# Patient Record
Sex: FEMALE
[De-identification: names, ages, dates, MRNs, and addresses within clinical notes are randomized per-mention and may not be internally consistent; named-entity substitution may affect disease eponyms.]

---

## 2017-01-05 PROBLEM — Z00.00 ENCOUNTER FOR PREVENTIVE HEALTH EXAMINATION: Status: ACTIVE | Noted: 2017-01-05

## 2017-01-09 ENCOUNTER — APPOINTMENT (OUTPATIENT)
Dept: CARDIOLOGY | Facility: CLINIC | Age: 57
End: 2017-01-09

## 2017-01-23 ENCOUNTER — APPOINTMENT (OUTPATIENT)
Dept: CARDIOLOGY | Facility: CLINIC | Age: 57
End: 2017-01-23

## 2017-01-30 ENCOUNTER — APPOINTMENT (OUTPATIENT)
Dept: CARDIOLOGY | Facility: CLINIC | Age: 57
End: 2017-01-30

## 2017-02-20 ENCOUNTER — APPOINTMENT (OUTPATIENT)
Dept: CARDIOLOGY | Facility: CLINIC | Age: 57
End: 2017-02-20

## 2017-02-27 ENCOUNTER — APPOINTMENT (OUTPATIENT)
Dept: CARDIOLOGY | Facility: CLINIC | Age: 57
End: 2017-02-27

## 2017-08-28 ENCOUNTER — APPOINTMENT (OUTPATIENT)
Dept: CARDIOLOGY | Facility: CLINIC | Age: 57
End: 2017-08-28
Payer: COMMERCIAL

## 2017-08-28 ENCOUNTER — APPOINTMENT (OUTPATIENT)
Dept: CARDIOLOGY | Facility: CLINIC | Age: 57
End: 2017-08-28

## 2017-08-28 PROCEDURE — 99214 OFFICE O/P EST MOD 30 MIN: CPT

## 2017-09-04 ENCOUNTER — EMERGENCY (EMERGENCY)
Facility: HOSPITAL | Age: 57
LOS: 1 days | End: 2017-09-04
Payer: COMMERCIAL

## 2017-09-04 PROCEDURE — 99285 EMERGENCY DEPT VISIT HI MDM: CPT

## 2017-09-04 PROCEDURE — 71010: CPT | Mod: 26

## 2017-09-11 ENCOUNTER — APPOINTMENT (OUTPATIENT)
Dept: CARDIOLOGY | Facility: CLINIC | Age: 57
End: 2017-09-11

## 2017-11-30 ENCOUNTER — MEDICATION RENEWAL (OUTPATIENT)
Age: 57
End: 2017-11-30

## 2018-01-03 ENCOUNTER — MEDICATION RENEWAL (OUTPATIENT)
Age: 58
End: 2018-01-03

## 2018-02-01 ENCOUNTER — MEDICATION RENEWAL (OUTPATIENT)
Age: 58
End: 2018-02-01

## 2018-02-01 DIAGNOSIS — Z86.69 PERSONAL HISTORY OF OTHER DISEASES OF THE NERVOUS SYSTEM AND SENSE ORGANS: ICD-10-CM

## 2018-02-01 DIAGNOSIS — Z83.3 FAMILY HISTORY OF DIABETES MELLITUS: ICD-10-CM

## 2018-02-01 DIAGNOSIS — Z86.79 PERSONAL HISTORY OF OTHER DISEASES OF THE CIRCULATORY SYSTEM: ICD-10-CM

## 2018-02-01 DIAGNOSIS — Z82.5 FAMILY HISTORY OF ASTHMA AND OTHER CHRONIC LOWER RESPIRATORY DISEASES: ICD-10-CM

## 2018-02-01 DIAGNOSIS — Z82.49 FAMILY HISTORY OF ISCHEMIC HEART DISEASE AND OTHER DISEASES OF THE CIRCULATORY SYSTEM: ICD-10-CM

## 2018-02-01 DIAGNOSIS — Z78.9 OTHER SPECIFIED HEALTH STATUS: ICD-10-CM

## 2018-02-01 DIAGNOSIS — Z83.1 FAMILY HISTORY OF OTHER INFECTIOUS AND PARASITIC DISEASES: ICD-10-CM

## 2018-02-01 DIAGNOSIS — R42 DIZZINESS AND GIDDINESS: ICD-10-CM

## 2018-02-01 DIAGNOSIS — F17.200 NICOTINE DEPENDENCE, UNSPECIFIED, UNCOMPLICATED: ICD-10-CM

## 2018-02-01 DIAGNOSIS — Z80.8 FAMILY HISTORY OF MALIGNANT NEOPLASM OF OTHER ORGANS OR SYSTEMS: ICD-10-CM

## 2018-02-28 ENCOUNTER — RECORD ABSTRACTING (OUTPATIENT)
Age: 58
End: 2018-02-28

## 2018-03-05 ENCOUNTER — NON-APPOINTMENT (OUTPATIENT)
Age: 58
End: 2018-03-05

## 2018-03-05 ENCOUNTER — APPOINTMENT (OUTPATIENT)
Dept: CARDIOLOGY | Facility: CLINIC | Age: 58
End: 2018-03-05
Payer: COMMERCIAL

## 2018-03-05 VITALS
DIASTOLIC BLOOD PRESSURE: 86 MMHG | WEIGHT: 130 LBS | HEART RATE: 90 BPM | SYSTOLIC BLOOD PRESSURE: 130 MMHG | HEIGHT: 62 IN | OXYGEN SATURATION: 100 % | BODY MASS INDEX: 23.92 KG/M2

## 2018-03-05 PROCEDURE — 99214 OFFICE O/P EST MOD 30 MIN: CPT

## 2018-03-05 PROCEDURE — 93000 ELECTROCARDIOGRAM COMPLETE: CPT

## 2018-04-02 ENCOUNTER — APPOINTMENT (OUTPATIENT)
Dept: CARDIOLOGY | Facility: CLINIC | Age: 58
End: 2018-04-02

## 2018-05-15 ENCOUNTER — APPOINTMENT (OUTPATIENT)
Dept: CARDIOLOGY | Facility: CLINIC | Age: 58
End: 2018-05-15
Payer: COMMERCIAL

## 2018-05-15 PROCEDURE — 93306 TTE W/DOPPLER COMPLETE: CPT

## 2018-08-13 ENCOUNTER — MEDICATION RENEWAL (OUTPATIENT)
Age: 58
End: 2018-08-13

## 2018-10-15 ENCOUNTER — APPOINTMENT (OUTPATIENT)
Dept: CARDIOLOGY | Facility: CLINIC | Age: 58
End: 2018-10-15
Payer: COMMERCIAL

## 2018-10-15 VITALS
HEART RATE: 92 BPM | DIASTOLIC BLOOD PRESSURE: 88 MMHG | WEIGHT: 128 LBS | OXYGEN SATURATION: 100 % | HEIGHT: 62 IN | BODY MASS INDEX: 23.55 KG/M2 | SYSTOLIC BLOOD PRESSURE: 158 MMHG

## 2018-10-15 DIAGNOSIS — Z86.79 PERSONAL HISTORY OF OTHER DISEASES OF THE CIRCULATORY SYSTEM: ICD-10-CM

## 2018-10-15 PROCEDURE — 99214 OFFICE O/P EST MOD 30 MIN: CPT

## 2018-10-18 ENCOUNTER — MEDICATION RENEWAL (OUTPATIENT)
Age: 58
End: 2018-10-18

## 2019-01-10 RX ORDER — IRBESARTAN 75 MG/1
75 TABLET ORAL DAILY
Qty: 90 | Refills: 1 | Status: DISCONTINUED | COMMUNITY
Start: 2017-11-30 | End: 2019-01-10

## 2019-01-16 ENCOUNTER — MEDICATION RENEWAL (OUTPATIENT)
Age: 59
End: 2019-01-16

## 2019-01-16 ENCOUNTER — RX RENEWAL (OUTPATIENT)
Age: 59
End: 2019-01-16

## 2019-04-16 ENCOUNTER — RX RENEWAL (OUTPATIENT)
Age: 59
End: 2019-04-16

## 2019-04-29 ENCOUNTER — APPOINTMENT (OUTPATIENT)
Dept: CARDIOLOGY | Facility: CLINIC | Age: 59
End: 2019-04-29
Payer: COMMERCIAL

## 2019-04-29 ENCOUNTER — NON-APPOINTMENT (OUTPATIENT)
Age: 59
End: 2019-04-29

## 2019-04-29 VITALS
HEART RATE: 93 BPM | OXYGEN SATURATION: 99 % | WEIGHT: 131 LBS | BODY MASS INDEX: 24.11 KG/M2 | SYSTOLIC BLOOD PRESSURE: 170 MMHG | DIASTOLIC BLOOD PRESSURE: 86 MMHG | HEIGHT: 62 IN

## 2019-04-29 PROCEDURE — 93000 ELECTROCARDIOGRAM COMPLETE: CPT

## 2019-04-29 PROCEDURE — 99214 OFFICE O/P EST MOD 30 MIN: CPT

## 2019-04-29 NOTE — DISCUSSION/SUMMARY
[FreeTextEntry1] : Cass is a 58-year-old female with medical history detailed above and active medical issues including:\par \par -Labile hypertension.  Office blood pressure is currently above goal <130/80 on irbesartan 75 mg per day  and amlodipine 2.5mg daily. Purchase a new home BP cuff check blood pressure daily over one week to confirm average resting blood pressure is at goal. \par \par -Dyspnea on exertion, abnormal EKG, CAD risk factors, normal Stress echo, normal LVEF Feb 2017. Patient will have noninvasive testing with exercise stress echo to assess for obstructive CAD, HR and BP response, exercise-induced arrhythmia, 2DEcho for LVEF, structural heart disease, carotid and abdominal ultrasound to assess for obstructive PAD.\par \par -Tobacco addiction. Smoking cessation has been discussed at length, patient will make an effort to reduce smoking and quit.\par \par - Hyperkalemia, hyponatremia and diarrhea improved after discontinuation of juicing. \par \par -Family history of premature CAD\par \par Advised patient to follow active lifestyle with regular cardiovascular exercise. Patient educated on lifestyle and diet modification with low sodium low fat diet and avoidance of excessive alcohol. Patient is aware to call with any symptoms or concerns. \par \par Patient will be seen in cardiology follow-up after noninvasive testing. \par \par Cass will follow up with Dr. Alvin Gaytan for primary care\par

## 2019-04-29 NOTE — PHYSICAL EXAM
[General Appearance - Well Developed] : well developed [Normal Appearance] : normal appearance [Well Groomed] : well groomed [General Appearance - Well Nourished] : well nourished [No Deformities] : no deformities [General Appearance - In No Acute Distress] : no acute distress [Normal Conjunctiva] : the conjunctiva exhibited no abnormalities [Eyelids - No Xanthelasma] : the eyelids demonstrated no xanthelasmas [Normal Oral Mucosa] : normal oral mucosa [No Oral Pallor] : no oral pallor [No Oral Cyanosis] : no oral cyanosis [Normal Jugular Venous A Waves Present] : normal jugular venous A waves present [Normal Jugular Venous V Waves Present] : normal jugular venous V waves present [No Jugular Venous Zapata A Waves] : no jugular venous zapata A waves [Respiration, Rhythm And Depth] : normal respiratory rhythm and effort [Exaggerated Use Of Accessory Muscles For Inspiration] : no accessory muscle use [Auscultation Breath Sounds / Voice Sounds] : lungs were clear to auscultation bilaterally [Heart Rate And Rhythm] : heart rate and rhythm were normal [Heart Sounds] : normal S1 and S2 [Murmurs] : no murmurs present [Abdomen Soft] : soft [Abdomen Tenderness] : non-tender [Abdomen Mass (___ Cm)] : no abdominal mass palpated [Nail Clubbing] : no clubbing of the fingernails [Cyanosis, Localized] : no localized cyanosis [Petechial Hemorrhages (___cm)] : no petechial hemorrhages [Skin Color & Pigmentation] : normal skin color and pigmentation [] : no rash [No Venous Stasis] : no venous stasis [Skin Lesions] : no skin lesions [No Skin Ulcers] : no skin ulcer [No Xanthoma] : no  xanthoma was observed [Oriented To Time, Place, And Person] : oriented to person, place, and time [Affect] : the affect was normal [Mood] : the mood was normal [No Anxiety] : not feeling anxious [FreeTextEntry1] : patient has odor of heavy tobacco

## 2019-04-29 NOTE — REASON FOR VISIT
[Follow-Up - Clinic] : a clinic follow-up of [Abnormal ECG] : an abnormal ECG [FreeTextEntry2] : MENDOZA, HTN, abnormal, tobacco addiction [FreeTextEntry1] : Cass is a 58-year-old female with recent diagnosis labile hypertension on Ibsartan, amlodipine, tobacco addiction 1PPD, family history of premature CAD, murmur.\par \par Patient has dyspnea moderate exertion unchanged,  Cardiovascular review of symptoms is negative for exertional chest pain, dyspnea, palpitations, dizziness or syncope. No PND or orthopnea leg edema. No bleeding or black stool.\par \par Patient is not exercising routine basis. She is walking 10 minutes with moderate dyspnea. She continues to smoke one pack per day. Smoking cessation has been discussed at length, patient will make an effort to reduce smoking and quit\par \par Office blood pressure is currently above goal <130/80 on irbesartan 75 mg per day  and amlodipine 2.5mg daily. Purchase a new home BP cuff check blood pressure daily over one week to confirm average resting blood pressure is at goal. \par \par Patient had been drinking large amounts of "juicing" drinks with vegetables. Patient had diarrhea and has improved after discontinuation of juicing. Hyperkalemia and hyponatremia has resolved after stopping juicing.\par \par Cass states that Jan 2017 she had upper respiratory infection, severe left thumb pain possibly related to carpal tunnel. Evaluation in [facility] found to have blood pressure of 201/98 started on Norvasc and Ibsartan. Patient had near syncope evaluated emergency room [facility]. She had follow up with PMD blood pressure elevation 180/90 Ibsartan increased to 150 mg in the morning with amlodipine 2.5 mg p.m. dose.\par \par Stress echo February February 2017, LVEF 60% normal exercise wall motion no ischemia, nonischemic stress EKG Baseline NSR, old septal MI, no anginal symptoms, 80% MPHR, 7 minute 45 seconds Jose protocol.\par \par 2-D echo January 2017, LVF 60%, mild diastolic dysfunction, mild MR and TR normal PASP\par \par Carotid and abdominal ultrasound January 2017 nonobstructive normal aortic size.

## 2019-05-20 ENCOUNTER — APPOINTMENT (OUTPATIENT)
Dept: CARDIOLOGY | Facility: CLINIC | Age: 59
End: 2019-05-20
Payer: COMMERCIAL

## 2019-05-20 PROCEDURE — 93880 EXTRACRANIAL BILAT STUDY: CPT

## 2019-06-24 ENCOUNTER — APPOINTMENT (OUTPATIENT)
Dept: CARDIOLOGY | Facility: CLINIC | Age: 59
End: 2019-06-24
Payer: COMMERCIAL

## 2019-06-24 PROCEDURE — 93351 STRESS TTE COMPLETE: CPT

## 2019-07-16 ENCOUNTER — RX RENEWAL (OUTPATIENT)
Age: 59
End: 2019-07-16

## 2019-07-16 ENCOUNTER — RX CHANGE (OUTPATIENT)
Age: 59
End: 2019-07-16

## 2019-07-16 RX ORDER — IRBESARTAN 75 MG/1
75 TABLET ORAL DAILY
Qty: 90 | Refills: 0 | Status: DISCONTINUED | COMMUNITY
End: 2019-07-16

## 2019-07-31 ENCOUNTER — MEDICATION RENEWAL (OUTPATIENT)
Age: 59
End: 2019-07-31

## 2019-08-26 ENCOUNTER — APPOINTMENT (OUTPATIENT)
Dept: CARDIOLOGY | Facility: CLINIC | Age: 59
End: 2019-08-26
Payer: COMMERCIAL

## 2019-08-26 VITALS
BODY MASS INDEX: 24.66 KG/M2 | DIASTOLIC BLOOD PRESSURE: 82 MMHG | WEIGHT: 134 LBS | HEIGHT: 62 IN | HEART RATE: 90 BPM | OXYGEN SATURATION: 99 % | SYSTOLIC BLOOD PRESSURE: 162 MMHG

## 2019-08-26 PROCEDURE — 99214 OFFICE O/P EST MOD 30 MIN: CPT

## 2019-08-26 NOTE — REVIEW OF SYSTEMS
[Shortness Of Breath] : shortness of breath [Dyspnea on exertion] : dyspnea during exertion [Negative] : Heme/Lymph [Chest  Pressure] : no chest pressure [Lower Ext Edema] : no extremity edema [Chest Pain] : no chest pain [Leg Claudication] : no intermittent leg claudication [Palpitations] : no palpitations

## 2019-08-26 NOTE — REASON FOR VISIT
[Follow-Up - Clinic] : a clinic follow-up of [Abnormal ECG] : an abnormal ECG [FreeTextEntry1] : Cass is a 58-year-old female with recent diagnosis labile hypertension on Ibsartan, amlodipine, tobacco addiction 1PPD, family history of premature CAD, murmur.\par \par Patient has dyspnea moderate exertion unchanged,  Cardiovascular review of symptoms is negative for exertional chest pain, dyspnea, palpitations, dizziness or syncope. No PND or orthopnea leg edema. No bleeding or black stool.\par \par Patient is not exercising routine basis. She is walking 10 minutes with moderate dyspnea. She continues to smoke one pack per day. Smoking cessation has been discussed at length, patient will make an effort to reduce smoking and quit\par \par Office blood pressure is currently above goal (<130/80) on Losartan 25 mg per day  and amlodipine 2.5mg daily. Not checking BP at home. \par \par Patient had been drinking large amounts of "juicing" drinks with vegetables. Patient had diarrhea and has improved after discontinuation of juicing. Hyperkalemia and hyponatremia has resolved after stopping juicing.\par \par Cass states that Jan 2017 she had upper respiratory infection, severe left thumb pain possibly related to carpal tunnel. Evaluation in [facility] found to have blood pressure of 201/98 started on Norvasc and Ibsartan. Patient had near syncope evaluated emergency room [facility]. She had follow up with PMD blood pressure elevation 180/90 Ibsartan increased to 150 mg in the morning with amlodipine 2.5 mg p.m. dose.\par \par Stress echo  6/24/2019, LVEF 60% normal exercise wall motion no ischemia, nonischemic stress EKG Baseline NSR, old septal MI, no anginal symptoms, 80% MPHR, 6 minute 01 seconds Jose protocol.\par \par 2-D echo January 2017, LVF 60%, mild diastolic dysfunction, mild MR and TR normal PASP\par \par Carotid and abdominal ultrasound 5/20/2019 nonobstructive normal aortic size. Thyroid nodule 1 x 1cm [FreeTextEntry2] : MENDOZA, HTN, abnormal, tobacco addiction

## 2019-08-26 NOTE — DISCUSSION/SUMMARY
[FreeTextEntry1] : Cass is a 58-year-old female with medical history detailed above and active medical issues including:\par \par Labile HTN: Office blood pressure is currently above goal (<130/80) on Losartan 25 mg per day  and amlodipine 2.5mg daily. Not checking BP at home.  Hypertensive response to exercise on stress echo. Recommend increasing amlodipine to 5mg dailiy. \par \par \par -Dyspnea on exertion, abnormal EKG, CAD risk factors, normal Stress echo, normal LVEF 6/24/2019. \par \par -Tobacco addiction. Smoking cessation has been discussed at length, patient will make an effort to reduce smoking and quit.\par \par - Hyperkalemia, hyponatremia and diarrhea improved after discontinuation of juicing. \par \par -Family history of premature CAD\par \par Advised patient to follow active lifestyle with regular cardiovascular exercise. Patient educated on lifestyle and diet modification with low sodium low fat diet and avoidance of excessive alcohol. Patient is aware to call with any symptoms or concerns. \par \par Patient will be seen in cardiology follow-up 4 weeks for BP check.\par \par \par

## 2019-08-26 NOTE — PHYSICAL EXAM
[General Appearance - Well Developed] : well developed [Normal Appearance] : normal appearance [Well Groomed] : well groomed [General Appearance - Well Nourished] : well nourished [No Deformities] : no deformities [General Appearance - In No Acute Distress] : no acute distress [Normal Conjunctiva] : the conjunctiva exhibited no abnormalities [Eyelids - No Xanthelasma] : the eyelids demonstrated no xanthelasmas [Normal Oral Mucosa] : normal oral mucosa [No Oral Cyanosis] : no oral cyanosis [No Oral Pallor] : no oral pallor [Normal Jugular Venous A Waves Present] : normal jugular venous A waves present [Normal Jugular Venous V Waves Present] : normal jugular venous V waves present [No Jugular Venous Zapata A Waves] : no jugular venous zapata A waves [Respiration, Rhythm And Depth] : normal respiratory rhythm and effort [Exaggerated Use Of Accessory Muscles For Inspiration] : no accessory muscle use [Auscultation Breath Sounds / Voice Sounds] : lungs were clear to auscultation bilaterally [Heart Sounds] : normal S1 and S2 [Heart Rate And Rhythm] : heart rate and rhythm were normal [Murmurs] : no murmurs present [Abnormal Walk] : normal gait [Gait - Sufficient For Exercise Testing] : the gait was sufficient for exercise testing [Cyanosis, Localized] : no localized cyanosis [Nail Clubbing] : no clubbing of the fingernails [Petechial Hemorrhages (___cm)] : no petechial hemorrhages [Skin Color & Pigmentation] : normal skin color and pigmentation [] : no rash [No Venous Stasis] : no venous stasis [Skin Lesions] : no skin lesions [No Skin Ulcers] : no skin ulcer [No Xanthoma] : no  xanthoma was observed [Oriented To Time, Place, And Person] : oriented to person, place, and time [Mood] : the mood was normal [Affect] : the affect was normal [No Anxiety] : not feeling anxious [FreeTextEntry1] : patient has odor of heavy tobacco

## 2019-09-30 ENCOUNTER — APPOINTMENT (OUTPATIENT)
Dept: CARDIOLOGY | Facility: CLINIC | Age: 59
End: 2019-09-30
Payer: COMMERCIAL

## 2019-09-30 VITALS
HEART RATE: 86 BPM | SYSTOLIC BLOOD PRESSURE: 140 MMHG | BODY MASS INDEX: 24.29 KG/M2 | HEIGHT: 62 IN | DIASTOLIC BLOOD PRESSURE: 82 MMHG | WEIGHT: 132 LBS | OXYGEN SATURATION: 96 %

## 2019-09-30 PROCEDURE — 99214 OFFICE O/P EST MOD 30 MIN: CPT

## 2019-09-30 NOTE — REVIEW OF SYSTEMS
[Dyspnea on exertion] : dyspnea during exertion [Shortness Of Breath] : shortness of breath [Negative] : Heme/Lymph [Chest  Pressure] : no chest pressure [Chest Pain] : no chest pain [Lower Ext Edema] : no extremity edema [Leg Claudication] : no intermittent leg claudication [Palpitations] : no palpitations

## 2019-09-30 NOTE — REASON FOR VISIT
[Follow-Up - Clinic] : a clinic follow-up of [FreeTextEntry1] : Cass is a 58-year-old female with recent diagnosis labile hypertension on Ibsartan, amlodipine, tobacco addiction 1PPD, family history of premature CAD, murmur.\par \par Patient has dyspnea moderate exertion unchanged,  Cardiovascular review of symptoms is negative for exertional chest pain, dyspnea, palpitations, dizziness or syncope. No PND or orthopnea leg edema. No bleeding or black stool.\par \par Patient is not exercising routine basis. She is walking 10 minutes with moderate dyspnea. She continues to smoke one pack per day. Smoking cessation has been discussed at length, patient will make an effort to reduce smoking and quit\par \par Office blood pressure is currently above goal <130/80 on irbesartan 75 mg per day  and amlodipine 2.5mg daily. Purchase a new home BP cuff check blood pressure daily over one week to confirm average resting blood pressure is at goal. \par \par Patient had been drinking large amounts of "juicing" drinks with vegetables. Patient had diarrhea and has improved after discontinuation of juicing. Hyperkalemia and hyponatremia has resolved after stopping juicing.\par \par Cass states that Jan 2017 she had upper respiratory infection, severe left thumb pain possibly related to carpal tunnel. Evaluation in [facility] found to have blood pressure of 201/98 started on Norvasc and Ibsartan. Patient had near syncope evaluated emergency room [facility]. She had follow up with PMD blood pressure elevation 180/90 Ibsartan increased to 150 mg in the morning with amlodipine 2.5 mg p.m. dose.\par \par Exercise stress echo June 2019, LVEF 60% normal exercise wall motion, no ischemic EKG response, no chest pain, 91% MPHR, 6 minutes Jose protocol.  Baseline sinus rhythm \par \par Stress echo February February 2017, LVEF 60% normal exercise wall motion no ischemia, nonischemic stress EKG Baseline NSR, old septal MI, no anginal symptoms, 80% MPHR, 7 minute 45 seconds Jose protocol.\par \par 2-D echo January 2017, LVF 60%, mild diastolic dysfunction, mild MR and TR normal PASP\par \par Carotid and abdominal ultrasound January 2017 nonobstructive normal aortic size. [FreeTextEntry2] : MENDOZA, HTN, abnormal EKG, tobacco addiction

## 2019-09-30 NOTE — DISCUSSION/SUMMARY
[FreeTextEntry1] : Cass is a 58-year-old female with medical history detailed above and active medical issues including:\par \par -Labile hypertension.  Office blood pressure is currently above goal <130/80 on irbesartan 75 mg per day  and amlodipine 2.5mg daily. Purchase a new home BP cuff check blood pressure daily over one week to confirm average resting blood pressure is at goal. \par \par -Dyspnea on exertion, abnormal EKG, CAD risk factors, normal Stress echo, normal LVEF June 2019. \par \par -Tobacco addiction. Smoking cessation has been discussed at length, patient will make an effort to reduce smoking and quit.\par \par - Hyperkalemia, hyponatremia and diarrhea improved after discontinuation of juicing. \par \par -Family history of premature CAD\par \par Advised patient to follow active lifestyle with regular cardiovascular exercise. Patient educated on lifestyle and diet modification with low sodium low fat diet and avoidance of excessive alcohol. Patient is aware to call with any symptoms or concerns. \par \par Patient will be seen in cardiology follow-up after noninvasive testing. Patient will have 2-D echocardiogram to assess for  LV systolic function, wall motion and  structural heart disease. Abdominal ultrasound to assess for obstructive PAD. \par \par Cass will follow up with Dr. Alvin Gaytan for primary care\par \par

## 2019-09-30 NOTE — PHYSICAL EXAM
[General Appearance - Well Developed] : well developed [Normal Appearance] : normal appearance [Well Groomed] : well groomed [General Appearance - Well Nourished] : well nourished [General Appearance - In No Acute Distress] : no acute distress [No Deformities] : no deformities [Normal Conjunctiva] : the conjunctiva exhibited no abnormalities [Normal Oral Mucosa] : normal oral mucosa [Eyelids - No Xanthelasma] : the eyelids demonstrated no xanthelasmas [No Oral Pallor] : no oral pallor [No Oral Cyanosis] : no oral cyanosis [Normal Jugular Venous A Waves Present] : normal jugular venous A waves present [No Jugular Venous Zapata A Waves] : no jugular venous zapata A waves [Normal Jugular Venous V Waves Present] : normal jugular venous V waves present [Respiration, Rhythm And Depth] : normal respiratory rhythm and effort [Auscultation Breath Sounds / Voice Sounds] : lungs were clear to auscultation bilaterally [Exaggerated Use Of Accessory Muscles For Inspiration] : no accessory muscle use [Heart Sounds] : normal S1 and S2 [Heart Rate And Rhythm] : heart rate and rhythm were normal [Murmurs] : no murmurs present [Abdomen Tenderness] : non-tender [Abdomen Soft] : soft [Abdomen Mass (___ Cm)] : no abdominal mass palpated [Nail Clubbing] : no clubbing of the fingernails [Cyanosis, Localized] : no localized cyanosis [Petechial Hemorrhages (___cm)] : no petechial hemorrhages [Skin Color & Pigmentation] : normal skin color and pigmentation [No Venous Stasis] : no venous stasis [] : no rash [Skin Lesions] : no skin lesions [No Skin Ulcers] : no skin ulcer [No Xanthoma] : no  xanthoma was observed [Oriented To Time, Place, And Person] : oriented to person, place, and time [Affect] : the affect was normal [Mood] : the mood was normal [No Anxiety] : not feeling anxious [FreeTextEntry1] : patient has odor of heavy tobacco

## 2020-06-23 ENCOUNTER — APPOINTMENT (OUTPATIENT)
Dept: CARDIOLOGY | Facility: CLINIC | Age: 60
End: 2020-06-23
Payer: COMMERCIAL

## 2020-06-23 PROCEDURE — 93979 VASCULAR STUDY: CPT

## 2020-06-23 PROCEDURE — 93306 TTE W/DOPPLER COMPLETE: CPT

## 2020-07-21 ENCOUNTER — APPOINTMENT (OUTPATIENT)
Dept: CARDIOLOGY | Facility: CLINIC | Age: 60
End: 2020-07-21

## 2021-01-11 ENCOUNTER — APPOINTMENT (OUTPATIENT)
Dept: CARDIOLOGY | Facility: CLINIC | Age: 61
End: 2021-01-11
Payer: COMMERCIAL

## 2021-01-11 ENCOUNTER — NON-APPOINTMENT (OUTPATIENT)
Age: 61
End: 2021-01-11

## 2021-01-11 VITALS
TEMPERATURE: 98.2 F | OXYGEN SATURATION: 99 % | WEIGHT: 135 LBS | HEART RATE: 99 BPM | HEIGHT: 62 IN | DIASTOLIC BLOOD PRESSURE: 90 MMHG | BODY MASS INDEX: 24.84 KG/M2 | SYSTOLIC BLOOD PRESSURE: 142 MMHG

## 2021-01-11 PROCEDURE — 99072 ADDL SUPL MATRL&STAF TM PHE: CPT

## 2021-01-11 PROCEDURE — 99214 OFFICE O/P EST MOD 30 MIN: CPT

## 2021-01-11 PROCEDURE — 93000 ELECTROCARDIOGRAM COMPLETE: CPT

## 2021-01-11 NOTE — REASON FOR VISIT
[Follow-Up - Clinic] : a clinic follow-up of [FreeTextEntry2] : MENDOZA, HTN, abnormal EKG, tobacco addiction [FreeTextEntry1] : Cass is a 60-year-old female with labile hypertension on losartan, amlodipine, tobacco addiction 1PPD, family history of premature CAD, murmur.\par \par Patient has dyspnea moderate exertion unchanged,  Cardiovascular review of symptoms is negative for exertional chest pain, dyspnea, palpitations, dizziness or syncope. No PND or orthopnea leg edema. No bleeding or black stool.\par \par Patient is not exercising routine basis. She is walking 10 minutes with moderate dyspnea. She continues to smoke one pack per day. Smoking cessation has been discussed at length, patient will make an effort to reduce smoking and quit\par \par Average home blood pressures at guideline goal on amlodipine, losartan\par \par Patient had been drinking large amounts of "juicing" drinks with vegetables. Patient had diarrhea and has improved after discontinuation of juicing. Hyperkalemia and hyponatremia has resolved after stopping juicing.\par \par Cass states that Jan 2017 she had upper respiratory infection, severe left thumb pain possibly related to carpal tunnel. Evaluation in [facility] found to have blood pressure of 201/98 started on Norvasc and Ibsartan. Patient had near syncope evaluated emergency room [facility]. She had follow up with PMD blood pressure elevation 180/90 Ibsartan increased to 150 mg in the morning with amlodipine 2.5 mg p.m. dose.\par \par Exercise stress echo June 2019, LVEF 60% normal exercise wall motion, no ischemic EKG response, no chest pain, 91% MPHR, 6 minutes Jose protocol.  Baseline sinus rhythm \par \par Stress echo February February 2017, LVEF 60% normal exercise wall motion no ischemia, nonischemic stress EKG Baseline NSR, old septal MI, no anginal symptoms, 80% MPHR, 7 minute 45 seconds Jose protocol.\par \par 2-D echo January 2017, LVF 60%, mild diastolic dysfunction, mild MR and TR normal PASP\par \par Carotid and abdominal ultrasound January 2017 nonobstructive normal aortic size.

## 2021-01-11 NOTE — DISCUSSION/SUMMARY
[FreeTextEntry1] : Cass is a 58-year-old female with medical history detailed above and active medical issues including:\par \par -Labile hypertension.  Average home blood pressures at guideline goal on amlodipine, losartan\par \par -Dyspnea on exertion, abnormal EKG, CAD risk factors, normal Stress echo, normal LVEF June 2019. \par \par -Tobacco addiction. Smoking cessation has been discussed at length, patient will make an effort to reduce smoking and quit.\par \par - Hyperkalemia, hyponatremia and diarrhea improved after discontinuation of juicing. \par \par -Family history of premature CAD\par \par Advised patient to follow active lifestyle with regular cardiovascular exercise. Patient educated on lifestyle and diet modification with low sodium low fat diet and avoidance of excessive alcohol. Patient is aware to call with any symptoms or concerns. \par \par Patient will be seen in cardiology follow-up 6 months.  Patient will have 2-D echocardiogram to assess LV systolic function, structural heart disease.  Carotid and abdominal ultrasound to assess for obstructive PAD.\par \par Cass will follow up with Dr. Alvin Gaytan for primary care\par \par

## 2021-01-11 NOTE — PHYSICAL EXAM
[General Appearance - Well Developed] : well developed [Normal Appearance] : normal appearance [Well Groomed] : well groomed [No Deformities] : no deformities [General Appearance - Well Nourished] : well nourished [General Appearance - In No Acute Distress] : no acute distress [Normal Conjunctiva] : the conjunctiva exhibited no abnormalities [Eyelids - No Xanthelasma] : the eyelids demonstrated no xanthelasmas [Normal Oral Mucosa] : normal oral mucosa [No Oral Pallor] : no oral pallor [No Oral Cyanosis] : no oral cyanosis [Normal Jugular Venous A Waves Present] : normal jugular venous A waves present [Normal Jugular Venous V Waves Present] : normal jugular venous V waves present [No Jugular Venous Zapata A Waves] : no jugular venous zapata A waves [Respiration, Rhythm And Depth] : normal respiratory rhythm and effort [Auscultation Breath Sounds / Voice Sounds] : lungs were clear to auscultation bilaterally [Exaggerated Use Of Accessory Muscles For Inspiration] : no accessory muscle use [Heart Rate And Rhythm] : heart rate and rhythm were normal [Heart Sounds] : normal S1 and S2 [Murmurs] : no murmurs present [Abdomen Soft] : soft [Abdomen Tenderness] : non-tender [Abdomen Mass (___ Cm)] : no abdominal mass palpated [Nail Clubbing] : no clubbing of the fingernails [Cyanosis, Localized] : no localized cyanosis [Petechial Hemorrhages (___cm)] : no petechial hemorrhages [Skin Color & Pigmentation] : normal skin color and pigmentation [] : no rash [No Venous Stasis] : no venous stasis [Skin Lesions] : no skin lesions [No Skin Ulcers] : no skin ulcer [No Xanthoma] : no  xanthoma was observed [Affect] : the affect was normal [Oriented To Time, Place, And Person] : oriented to person, place, and time [Mood] : the mood was normal [No Anxiety] : not feeling anxious [FreeTextEntry1] : patient has odor of heavy tobacco

## 2021-07-06 ENCOUNTER — APPOINTMENT (OUTPATIENT)
Dept: CARDIOLOGY | Facility: CLINIC | Age: 61
End: 2021-07-06
Payer: COMMERCIAL

## 2021-07-06 PROCEDURE — 93306 TTE W/DOPPLER COMPLETE: CPT

## 2021-07-06 PROCEDURE — 93880 EXTRACRANIAL BILAT STUDY: CPT

## 2021-07-06 PROCEDURE — 99072 ADDL SUPL MATRL&STAF TM PHE: CPT

## 2021-07-06 PROCEDURE — 93979 VASCULAR STUDY: CPT

## 2021-07-19 ENCOUNTER — NON-APPOINTMENT (OUTPATIENT)
Age: 61
End: 2021-07-19

## 2021-07-19 ENCOUNTER — APPOINTMENT (OUTPATIENT)
Dept: CARDIOLOGY | Facility: CLINIC | Age: 61
End: 2021-07-19
Payer: COMMERCIAL

## 2021-07-19 VITALS
HEIGHT: 62.5 IN | SYSTOLIC BLOOD PRESSURE: 126 MMHG | BODY MASS INDEX: 24.04 KG/M2 | HEART RATE: 94 BPM | WEIGHT: 134 LBS | DIASTOLIC BLOOD PRESSURE: 80 MMHG | OXYGEN SATURATION: 98 % | TEMPERATURE: 98.1 F

## 2021-07-19 DIAGNOSIS — I73.9 PERIPHERAL VASCULAR DISEASE, UNSPECIFIED: ICD-10-CM

## 2021-07-19 PROCEDURE — 99214 OFFICE O/P EST MOD 30 MIN: CPT

## 2021-07-19 PROCEDURE — 99072 ADDL SUPL MATRL&STAF TM PHE: CPT

## 2021-07-19 NOTE — DISCUSSION/SUMMARY
[FreeTextEntry1] : Cass is a 60-year-old female with medical history detailed above and active medical issues including:\par \par -Labile hypertension.  Average home blood pressures at guideline goal on amlodipine, losartan\par \par -Dyspnea on exertion, abnormal EKG, CAD risk factors, normal Stress echo, normal LVEF June 2019. \par \par -Tobacco addiction. Smoking cessation has been discussed at length, patient will make an effort to reduce smoking and quit.\par \par -  PVD, left leg claudication walking less than 1 block.  MARYCHUY ordered with TEB follow-up. \par \par - Hyperkalemia, hyponatremia and diarrhea improved after discontinuation of juicing. \par \par -Family history of premature CAD\par \par Advised patient to follow active lifestyle with regular cardiovascular exercise. Patient educated on lifestyle and diet modification with low sodium low fat diet and avoidance of excessive alcohol. Patient is aware to call with any symptoms or concerns. \par \par Patient will be seen in cardiology follow-up 6 months. \par \par Cass will follow up with Dr. Alvin Gaytan for primary care\par \par

## 2021-07-19 NOTE — PHYSICAL EXAM
[General Appearance - Well Developed] : well developed [Normal Appearance] : normal appearance [Well Groomed] : well groomed [General Appearance - Well Nourished] : well nourished [No Deformities] : no deformities [General Appearance - In No Acute Distress] : no acute distress [Eyelids - No Xanthelasma] : the eyelids demonstrated no xanthelasmas [Normal Oral Mucosa] : normal oral mucosa [No Oral Pallor] : no oral pallor [No Oral Cyanosis] : no oral cyanosis [Normal Jugular Venous A Waves Present] : normal jugular venous A waves present [Normal Jugular Venous V Waves Present] : normal jugular venous V waves present [No Jugular Venous Zapata A Waves] : no jugular venous zpaata A waves [Respiration, Rhythm And Depth] : normal respiratory rhythm and effort [Exaggerated Use Of Accessory Muscles For Inspiration] : no accessory muscle use [Auscultation Breath Sounds / Voice Sounds] : lungs were clear to auscultation bilaterally [Heart Rate And Rhythm] : heart rate and rhythm were normal [Heart Sounds] : normal S1 and S2 [Murmurs] : no murmurs present [Abdomen Soft] : soft [Abdomen Tenderness] : non-tender [Abdomen Mass (___ Cm)] : no abdominal mass palpated [Nail Clubbing] : no clubbing of the fingernails [Cyanosis, Localized] : no localized cyanosis [Petechial Hemorrhages (___cm)] : no petechial hemorrhages [Skin Color & Pigmentation] : normal skin color and pigmentation [] : no rash [No Venous Stasis] : no venous stasis [Skin Lesions] : no skin lesions [No Skin Ulcers] : no skin ulcer [No Xanthoma] : no  xanthoma was observed [Oriented To Time, Place, And Person] : oriented to person, place, and time [Affect] : the affect was normal [Mood] : the mood was normal [No Anxiety] : not feeling anxious [Well Developed] : well developed [Well Nourished] : well nourished [No Acute Distress] : no acute distress [Normal Conjunctiva] : normal conjunctiva [Normal Venous Pressure] : normal venous pressure [No Carotid Bruit] : no carotid bruit [Normal S1, S2] : normal S1, S2 [No Murmur] : no murmur [No Rub] : no rub [No Gallop] : no gallop [Clear Lung Fields] : clear lung fields [Good Air Entry] : good air entry [No Respiratory Distress] : no respiratory distress  [Soft] : abdomen soft [Non Tender] : non-tender [No Masses/organomegaly] : no masses/organomegaly [Normal Bowel Sounds] : normal bowel sounds [Normal Gait] : normal gait [No Edema] : no edema [No Cyanosis] : no cyanosis [No Clubbing] : no clubbing [No Varicosities] : no varicosities [No Rash] : no rash [No Skin Lesions] : no skin lesions [Moves all extremities] : moves all extremities [No Focal Deficits] : no focal deficits [Normal Speech] : normal speech [Alert and Oriented] : alert and oriented [Normal memory] : normal memory [FreeTextEntry1] : patient has odor of heavy tobacco

## 2021-07-19 NOTE — REVIEW OF SYSTEMS
[Feeling Fatigued] : feeling fatigued [Dyspnea on exertion] : dyspnea during exertion [Leg Claudication] : intermittent leg claudication [Negative] : Heme/Lymph [FreeTextEntry5] : Left leg claudication walking less than 1 block

## 2021-07-19 NOTE — REASON FOR VISIT
[Other: ____] : [unfilled] [FreeTextEntry1] : Cass is a 60-year-old female with labile hypertension on losartan, amlodipine, tobacco addiction 1PPD, family history of premature CAD, murmur.\par \par Patient has dyspnea moderate exertion unchanged,  Cardiovascular review of symptoms is negative for exertional chest pain, dyspnea, palpitations, dizziness or syncope. No PND or orthopnea leg edema. No bleeding or black stool.  Patient has increasing fatigue.\par \par No exercise routine.  Patient is walking 10 minutes with moderate dyspnea.  Cass has left leg claudication walking less than 1 block.  Patient continues to smoke one pack per day. Smoking cessation has been discussed at length, patient will make an effort to reduce smoking and quit\par \par Average home blood pressures at guideline goal on amlodipine, losartan\par \par Exercise stress echo June 2019, LVEF 60% normal exercise wall motion, no ischemic EKG response, no chest pain, 91% MPHR, 6 minutes Jose protocol.  Baseline sinus rhythm \par \par Echocardiogram July 2021, LVEF 60%, trace MR, normal RVSP.\par \par Carotid and abdominal ultrasound July 2021, mild nonobstructive plaque, normal abdominal aortic size.\par \par Stress echo February February 2017, LVEF 60% normal exercise wall motion no ischemia, nonischemic stress EKG Baseline NSR, old septal MI, no anginal symptoms, 80% MPHR, 7 minute 45 seconds Jose protocol.\par \par 2-D echo January 2017, LVF 60%, mild diastolic dysfunction, mild MR and TR normal PASP\par \par Carotid and abdominal ultrasound January 2017 nonobstructive normal aortic size.

## 2021-07-22 ENCOUNTER — RX RENEWAL (OUTPATIENT)
Age: 61
End: 2021-07-22

## 2022-02-03 ENCOUNTER — NON-APPOINTMENT (OUTPATIENT)
Age: 62
End: 2022-02-03

## 2022-02-03 ENCOUNTER — APPOINTMENT (OUTPATIENT)
Dept: CARDIOLOGY | Facility: CLINIC | Age: 62
End: 2022-02-03
Payer: COMMERCIAL

## 2022-02-03 VITALS
OXYGEN SATURATION: 100 % | BODY MASS INDEX: 24.11 KG/M2 | HEART RATE: 106 BPM | HEIGHT: 62 IN | DIASTOLIC BLOOD PRESSURE: 80 MMHG | SYSTOLIC BLOOD PRESSURE: 142 MMHG | TEMPERATURE: 97.6 F | WEIGHT: 131 LBS

## 2022-02-03 DIAGNOSIS — E87.5 HYPERKALEMIA: ICD-10-CM

## 2022-02-03 PROCEDURE — 93000 ELECTROCARDIOGRAM COMPLETE: CPT

## 2022-02-03 PROCEDURE — 99214 OFFICE O/P EST MOD 30 MIN: CPT

## 2022-02-03 NOTE — DISCUSSION/SUMMARY
[FreeTextEntry1] : Cass is a 61-year-old female with medical history detailed above and active medical issues including:\par \par -Labile hypertension.  Continue to follow average resting home blood pressures to confirm BP at guideline goal on amlodipine, losartan\par \par - Dyspnea on exertion, abnormal EKG, CAD risk factors, normal Stress echo, normal LVEF June 2019.  Patient declines performing a current stress test.\par \par -Tobacco addiction smoking 1/2 pack/day. Smoking cessation has been discussed at length, patient will make an effort to reduce smoking and quit.\par \par - Hyperkalemia, hyponatremia and diarrhea improved after discontinuation of juicing. \par \par -Family history of premature CAD\par \par Advised patient to follow active lifestyle with regular cardiovascular exercise. Patient educated on lifestyle and diet modification with low sodium low fat diet and avoidance of excessive alcohol. Patient is aware to call with any symptoms or concerns. \par \par Patient will be seen in cardiology follow-up 6 months same-day echocardiogram.  Current cardiac medications remain unchanged and renewals  are up to date. Repeat labs will be ordered with PMD.\par \par Cass will follow up with Dr. Alvin Gaytan for primary care\par \par

## 2022-02-03 NOTE — REASON FOR VISIT
[Follow-Up - Clinic] : a clinic follow-up of [Other: ____] : [unfilled] [FreeTextEntry1] : Cass is a 61-year-old female with labile hypertension on losartan, amlodipine, tobacco addiction 1PPD, family history of premature CAD, murmur.\par \par Patient has dyspnea moderate exertion unchanged,  Cardiovascular review of symptoms is negative for exertional chest pain, dyspnea, palpitations, dizziness or syncope. No PND or orthopnea leg edema. No bleeding or black stool.\par \par Patient is not exercising routine basis. She is walking 10 minutes with moderate dyspnea. \par \par Patient continues to smoke one pack per day. Smoking cessation has been discussed at length, patient will make an effort to reduce smoking and quit\par \par Average home blood pressures at guideline goal on amlodipine, losartan\par \par Patient had been drinking large amounts of "juicing" drinks with vegetables. Patient had diarrhea and has improved after discontinuation of juicing. Hyperkalemia and hyponatremia has resolved after stopping juicing.\par \par Cass states that Jan 2017 she had upper respiratory infection, severe left thumb pain possibly related to carpal tunnel. Evaluation in [facility] found to have blood pressure of 201/98 started on Norvasc and Ibsartan. Patient had near syncope evaluated emergency room [facility]. She had follow up with PMD blood pressure elevation 180/90 Ibsartan increased to 150 mg in the morning with amlodipine 2.5 mg p.m. dose.\par \par Exercise stress echo June 2019, LVEF 60% normal exercise wall motion, no ischemic EKG response, no chest pain, 91% MPHR, 6 minutes Jose protocol.  Baseline sinus rhythm \par \par Stress echo February February 2017, LVEF 60% normal exercise wall motion no ischemia, nonischemic stress EKG Baseline NSR, old septal MI, no anginal symptoms, 80% MPHR, 7 minute 45 seconds Jose protocol.\par \par 2-D echo January 2017, LVF 60%, mild diastolic dysfunction, mild MR and TR normal PASP\par \par Carotid and abdominal ultrasound January 2017 nonobstructive normal aortic size.

## 2022-02-03 NOTE — PHYSICAL EXAM
[General Appearance - Well Developed] : well developed [Normal Appearance] : normal appearance [Well Groomed] : well groomed [General Appearance - Well Nourished] : well nourished [No Deformities] : no deformities [General Appearance - In No Acute Distress] : no acute distress [Eyelids - No Xanthelasma] : the eyelids demonstrated no xanthelasmas [Normal Oral Mucosa] : normal oral mucosa [No Oral Pallor] : no oral pallor [No Oral Cyanosis] : no oral cyanosis [Normal Jugular Venous A Waves Present] : normal jugular venous A waves present [Normal Jugular Venous V Waves Present] : normal jugular venous V waves present [No Jugular Venous Zapata A Waves] : no jugular venous zapata A waves [Respiration, Rhythm And Depth] : normal respiratory rhythm and effort [Exaggerated Use Of Accessory Muscles For Inspiration] : no accessory muscle use [Auscultation Breath Sounds / Voice Sounds] : lungs were clear to auscultation bilaterally [Heart Rate And Rhythm] : heart rate and rhythm were normal [Heart Sounds] : normal S1 and S2 [Murmurs] : no murmurs present [Abdomen Soft] : soft [Abdomen Tenderness] : non-tender [Abdomen Mass (___ Cm)] : no abdominal mass palpated [Nail Clubbing] : no clubbing of the fingernails [Cyanosis, Localized] : no localized cyanosis [Petechial Hemorrhages (___cm)] : no petechial hemorrhages [Skin Color & Pigmentation] : normal skin color and pigmentation [] : no rash [No Venous Stasis] : no venous stasis [Skin Lesions] : no skin lesions [No Skin Ulcers] : no skin ulcer [No Xanthoma] : no  xanthoma was observed [Oriented To Time, Place, And Person] : oriented to person, place, and time [Affect] : the affect was normal [Mood] : the mood was normal [No Anxiety] : not feeling anxious [Well Developed] : well developed [Well Nourished] : well nourished [No Acute Distress] : no acute distress [Normal Conjunctiva] : normal conjunctiva [Normal Venous Pressure] : normal venous pressure [No Carotid Bruit] : no carotid bruit [Normal S1, S2] : normal S1, S2 [No Murmur] : no murmur [No Rub] : no rub [No Gallop] : no gallop [Clear Lung Fields] : clear lung fields [Good Air Entry] : good air entry [No Respiratory Distress] : no respiratory distress  [Soft] : abdomen soft [Non Tender] : non-tender [No Masses/organomegaly] : no masses/organomegaly [Normal Bowel Sounds] : normal bowel sounds [Normal Gait] : normal gait [No Edema] : no edema [No Cyanosis] : no cyanosis [No Clubbing] : no clubbing [No Varicosities] : no varicosities [No Rash] : no rash [No Skin Lesions] : no skin lesions [Moves all extremities] : moves all extremities [No Focal Deficits] : no focal deficits [Normal Speech] : normal speech [Alert and Oriented] : alert and oriented [Normal memory] : normal memory [FreeTextEntry1] : patient has odor of heavy tobacco

## 2022-07-19 ENCOUNTER — RX RENEWAL (OUTPATIENT)
Age: 62
End: 2022-07-19

## 2022-07-19 RX ORDER — LOSARTAN POTASSIUM 25 MG/1
25 TABLET, FILM COATED ORAL
Qty: 90 | Refills: 3 | Status: ACTIVE | COMMUNITY
Start: 2019-07-16 | End: 1900-01-01

## 2022-07-25 RX ORDER — AMLODIPINE BESYLATE 5 MG/1
5 TABLET ORAL DAILY
Qty: 90 | Refills: 1 | Status: ACTIVE | COMMUNITY
Start: 1900-01-01 | End: 1900-01-01

## 2022-08-02 ENCOUNTER — APPOINTMENT (OUTPATIENT)
Dept: CARDIOLOGY | Facility: CLINIC | Age: 62
End: 2022-08-02

## 2022-08-02 PROCEDURE — 93306 TTE W/DOPPLER COMPLETE: CPT

## 2022-08-10 ENCOUNTER — APPOINTMENT (OUTPATIENT)
Dept: CARDIOLOGY | Facility: CLINIC | Age: 62
End: 2022-08-10

## 2022-08-10 VITALS
SYSTOLIC BLOOD PRESSURE: 142 MMHG | TEMPERATURE: 97.3 F | OXYGEN SATURATION: 99 % | HEART RATE: 94 BPM | BODY MASS INDEX: 23 KG/M2 | WEIGHT: 125 LBS | HEIGHT: 62 IN | DIASTOLIC BLOOD PRESSURE: 90 MMHG

## 2022-08-10 DIAGNOSIS — E78.2 MIXED HYPERLIPIDEMIA: ICD-10-CM

## 2022-08-10 DIAGNOSIS — R51.9 HEADACHE, UNSPECIFIED: ICD-10-CM

## 2022-08-10 DIAGNOSIS — I10 ESSENTIAL (PRIMARY) HYPERTENSION: ICD-10-CM

## 2022-08-10 DIAGNOSIS — R42 DIZZINESS AND GIDDINESS: ICD-10-CM

## 2022-08-10 DIAGNOSIS — R06.09 OTHER FORMS OF DYSPNEA: ICD-10-CM

## 2022-08-10 DIAGNOSIS — Z72.0 TOBACCO USE: ICD-10-CM

## 2022-08-10 PROCEDURE — 99215 OFFICE O/P EST HI 40 MIN: CPT

## 2022-08-10 NOTE — REASON FOR VISIT
[Other: ____] : [unfilled] [FreeTextEntry1] : Cass is a 61-year-old female with labile hypertension on losartan, amlodipine, tobacco addiction 1PPD, family history of premature CAD, murmur.\par \par Patient has dyspnea moderate exertion unchanged,  Cardiovascular review of symptoms is negative for exertional chest pain, dyspnea, palpitations, dizziness or syncope. No PND or orthopnea leg edema. No bleeding or black stool.\par \par Patient is not exercising routine basis. She is walking 10 minutes with moderate dyspnea. \par \par Patient continues to smoke one pack per day. Smoking cessation has been discussed at length, patient doen not wish to reduce smoking and quit\par \par Average home blood pressures at guideline goal on amlodipine, losartan\par \par Patient had been drinking large amounts of "juicing" drinks with vegetables. Patient had diarrhea and has improved after discontinuation of juicing. Hyperkalemia and hyponatremia has resolved after stopping juicing.\par \par aCss states that Jan 2017 she had upper respiratory infection, severe left thumb pain possibly related to carpal tunnel. Evaluation in [facility] found to have blood pressure of 201/98 started on Norvasc and Ibsartan. Patient had near syncope evaluated emergency room [facility]. She had follow up with PMD blood pressure elevation 180/90 Ibsartan increased to 150 mg in the morning with amlodipine 2.5 mg p.m. dose.\par \par Echocardiogram Aug 2022 LVEF 65%, trace MR\par \par Exercise stress echo June 2019, LVEF 60% normal exercise wall motion, no ischemic EKG response, no chest pain, 91% MPHR, 6 minutes Jose protocol.  Baseline sinus rhythm \par \par Stress echo February February 2017, LVEF 60% normal exercise wall motion no ischemia, nonischemic stress EKG Baseline NSR, old septal MI, no anginal symptoms, 80% MPHR, 7 minute 45 seconds Jose protocol.\par \par 2-D echo January 2017, LVF 60%, mild diastolic dysfunction, mild MR and TR normal PASP\par \par Carotid and abdominal ultrasound January 2017 nonobstructive normal aortic size.

## 2022-08-10 NOTE — DISCUSSION/SUMMARY
[FreeTextEntry1] : Cass is a 61-year-old female with medical history detailed above and active medical issues including:\par \par - Labile hypertension.  Continue to follow average resting home blood pressures to confirm BP at guideline goal on amlodipine, losartan.  Patient does not wish to make any changes in blood pressure medication.\par \par - Dyspnea on exertion, abnormal EKG, CAD risk factors, normal Stress echo, normal LVEF June 2019.  Patient declines performing a current stress test.  Coronary calcium score ordered for restratification with TEB follow-up. \par \par - Hyperlipidemia declines statin therapy.\par \par - Tobacco addiction smoking 1/2 pack/day. Smoking cessation has been discussed at length, patient will make an effort to reduce smoking and quit.\par \par - Hyperkalemia, hyponatremia and diarrhea improved after discontinuation of juicing. \par \par - Family history of premature CAD\par \par Advised patient to follow active lifestyle with regular cardiovascular exercise. Patient educated on lifestyle and diet modification with low sodium low fat diet and avoidance of excessive alcohol. Patient is aware to call with any symptoms or concerns. \par \par Patient will be seen in cardiology follow-up 6 months same-day echocardiogram.  Current cardiac medications remain unchanged and renewals  are up to date. Repeat labs will be ordered with PMD.\par \par Cass will follow up with Dr. Alvin Gaytan for primary care\par \par

## 2022-08-10 NOTE — PHYSICAL EXAM
[Well Developed] : well developed [Well Nourished] : well nourished [No Acute Distress] : no acute distress [Normal Venous Pressure] : normal venous pressure [No Carotid Bruit] : no carotid bruit [Normal S1, S2] : normal S1, S2 [No Murmur] : no murmur [No Rub] : no rub [No Gallop] : no gallop [Clear Lung Fields] : clear lung fields [Good Air Entry] : good air entry [No Respiratory Distress] : no respiratory distress  [Soft] : abdomen soft [Non Tender] : non-tender [No Masses/organomegaly] : no masses/organomegaly [Normal Bowel Sounds] : normal bowel sounds [Normal Gait] : normal gait [No Edema] : no edema [No Cyanosis] : no cyanosis [No Clubbing] : no clubbing [No Varicosities] : no varicosities [No Rash] : no rash [No Skin Lesions] : no skin lesions [Moves all extremities] : moves all extremities [No Focal Deficits] : no focal deficits [Normal Speech] : normal speech [Alert and Oriented] : alert and oriented [Normal memory] : normal memory [General Appearance - Well Developed] : well developed [Normal Appearance] : normal appearance [Well Groomed] : well groomed [General Appearance - Well Nourished] : well nourished [No Deformities] : no deformities [General Appearance - In No Acute Distress] : no acute distress [Normal Conjunctiva] : the conjunctiva exhibited no abnormalities [Eyelids - No Xanthelasma] : the eyelids demonstrated no xanthelasmas [Normal Oral Mucosa] : normal oral mucosa [No Oral Pallor] : no oral pallor [No Oral Cyanosis] : no oral cyanosis [Normal Jugular Venous A Waves Present] : normal jugular venous A waves present [Normal Jugular Venous V Waves Present] : normal jugular venous V waves present [No Jugular Venous Zapata A Waves] : no jugular venous zapata A waves [Respiration, Rhythm And Depth] : normal respiratory rhythm and effort [Exaggerated Use Of Accessory Muscles For Inspiration] : no accessory muscle use [Auscultation Breath Sounds / Voice Sounds] : lungs were clear to auscultation bilaterally [Heart Rate And Rhythm] : heart rate and rhythm were normal [Heart Sounds] : normal S1 and S2 [Murmurs] : no murmurs present [Abdomen Soft] : soft [Abdomen Tenderness] : non-tender [Abdomen Mass (___ Cm)] : no abdominal mass palpated [Nail Clubbing] : no clubbing of the fingernails [Cyanosis, Localized] : no localized cyanosis [Petechial Hemorrhages (___cm)] : no petechial hemorrhages [Skin Color & Pigmentation] : normal skin color and pigmentation [] : no rash [No Venous Stasis] : no venous stasis [Skin Lesions] : no skin lesions [No Skin Ulcers] : no skin ulcer [No Xanthoma] : no  xanthoma was observed [Oriented To Time, Place, And Person] : oriented to person, place, and time [Affect] : the affect was normal [Mood] : the mood was normal [No Anxiety] : not feeling anxious [FreeTextEntry1] : patient has odor of heavy tobacco

## 2023-02-08 ENCOUNTER — APPOINTMENT (OUTPATIENT)
Dept: CARDIOLOGY | Facility: CLINIC | Age: 63
End: 2023-02-08